# Patient Record
(demographics unavailable — no encounter records)

---

## 2025-05-01 NOTE — REASON FOR VISIT
[Annual] : an annual visit. [TextEntry] : ANNUAL EXAM.  MENSES MONTHLY.  C/O PAIN W INTERCOURSE-  SAME PARTNER;  PAIN HAS BEEN OCCURRING FOR APPROX 6MTHS.  DENIES DYSMENORRHEA,  PAIN IS VAGINAL.  PAIN IS NOT PRESENT AFTER INTERCOURSE.  DENIES ITCHING OR IRRITATION;  USING SAME LUBRICANT FOR MANY YRS

## 2025-05-01 NOTE — PLAN
[FreeTextEntry1] : IF SONO NEG CONSIDER PELVIC PT Several scattered comedones and inflammatory papules on the face.

## 2025-05-01 NOTE — PHYSICAL EXAM
[Appropriately responsive] : appropriately responsive [Alert] : alert [No Acute Distress] : no acute distress [No Lymphadenopathy] : no lymphadenopathy [Soft] : soft [Non-tender] : non-tender [Non-distended] : non-distended [No HSM] : No HSM [No Lesions] : no lesions [No Mass] : no mass [Oriented x3] : oriented x3 [Examination Of The Breasts] : a normal appearance [No Masses] : no breast masses were palpable [Labia Majora] : normal [Labia Minora] : normal [Normal] : normal [Uterine Adnexae] : normal [FreeTextEntry6] : NO CMT;  NO VESTIBULAR TENDERNESS

## 2025-05-01 NOTE — HISTORY OF PRESENT ILLNESS
[Mammogramdate] : AGE 35 [PapSmeardate] : 2023 [Currently Active] : currently active [Condoms] : Condoms